# Patient Record
Sex: MALE | Race: BLACK OR AFRICAN AMERICAN | NOT HISPANIC OR LATINO | Employment: FULL TIME | URBAN - METROPOLITAN AREA
[De-identification: names, ages, dates, MRNs, and addresses within clinical notes are randomized per-mention and may not be internally consistent; named-entity substitution may affect disease eponyms.]

---

## 2023-12-18 ENCOUNTER — HOSPITAL ENCOUNTER (EMERGENCY)
Facility: CLINIC | Age: 38
Discharge: HOME | End: 2023-12-18
Attending: EMERGENCY MEDICINE | Admitting: EMERGENCY MEDICINE
Payer: OTHER MISCELLANEOUS

## 2023-12-18 ENCOUNTER — APPOINTMENT (OUTPATIENT)
Dept: GENERAL RADIOLOGY | Facility: CLINIC | Age: 38
End: 2023-12-18
Attending: EMERGENCY MEDICINE
Payer: OTHER MISCELLANEOUS

## 2023-12-18 VITALS
SYSTOLIC BLOOD PRESSURE: 141 MMHG | OXYGEN SATURATION: 100 % | RESPIRATION RATE: 16 BRPM | TEMPERATURE: 98.1 F | HEART RATE: 74 BPM | DIASTOLIC BLOOD PRESSURE: 85 MMHG

## 2023-12-18 DIAGNOSIS — M54.50 ACUTE LEFT-SIDED LOW BACK PAIN WITHOUT SCIATICA: ICD-10-CM

## 2023-12-18 PROCEDURE — 99283 EMERGENCY DEPT VISIT LOW MDM: CPT

## 2023-12-18 PROCEDURE — 250N000013 HC RX MED GY IP 250 OP 250 PS 637: Performed by: EMERGENCY MEDICINE

## 2023-12-18 PROCEDURE — 72100 X-RAY EXAM L-S SPINE 2/3 VWS: CPT

## 2023-12-18 RX ORDER — CYCLOBENZAPRINE HCL 10 MG
10 TABLET ORAL 3 TIMES DAILY PRN
Qty: 10 TABLET | Refills: 0 | Status: SHIPPED | OUTPATIENT
Start: 2023-12-18

## 2023-12-18 RX ORDER — IBUPROFEN 600 MG/1
600 TABLET, FILM COATED ORAL ONCE
Status: COMPLETED | OUTPATIENT
Start: 2023-12-18 | End: 2023-12-18

## 2023-12-18 RX ORDER — CYCLOBENZAPRINE HCL 10 MG
10 TABLET ORAL ONCE
Status: COMPLETED | OUTPATIENT
Start: 2023-12-18 | End: 2023-12-18

## 2023-12-18 RX ADMIN — CYCLOBENZAPRINE 10 MG: 10 TABLET, FILM COATED ORAL at 07:44

## 2023-12-18 RX ADMIN — IBUPROFEN 600 MG: 600 TABLET, FILM COATED ORAL at 07:44

## 2023-12-18 NOTE — ED TRIAGE NOTES
Pt reports while lifting something above his head at work this morning he developed lower back pain. Pt adds he is on abx for dental infection.      Triage Assessment (Adult)       Row Name 12/18/23 0453          Triage Assessment    Airway WDL WDL        Respiratory WDL    Respiratory WDL WDL        Skin Circulation/Temperature WDL    Skin Circulation/Temperature WDL WDL        Cardiac WDL    Cardiac WDL WDL        Peripheral/Neurovascular WDL    Peripheral Neurovascular WDL WDL        Cognitive/Neuro/Behavioral WDL    Cognitive/Neuro/Behavioral WDL WDL

## 2023-12-18 NOTE — LETTER
December 18, 2023      To Whom It May Concern:      Nydia Barnard was seen in our Emergency Department today, 12/18/23.  I expect his condition to improve over the next 2 days.  He may return to work/school when improved.    Sincerely,        Gely DUNN

## 2023-12-18 NOTE — ED PROVIDER NOTES
History     Chief Complaint:  Back Pain     HPI   Nydia Barnard is a 38 year old male who presents to the ED with low back pain. Patient states that he was lifting a small object above his head at work this morning when he developed sudden pain across his entire low back that was so severe he had to grab onto something to stabilize himself. Since stabilizing himself he has had continued pain in his low back that is worsened on the left side. The pain does not radiate elsewhere. He is now having a hard time walking due to the pain. Denies numbness or tingling in his legs and urinary or bowel incontinence. No prior back injuries. No known medication allergies. Patient adds that he is currently on a course of antibiotics for a dental infection.     Independent Historian:   None - Patient Only    Review of External Notes:   Reviewed 9/8/2023 office visit    Medications:    The patient is not currently taking any prescribed medications.    Past Medical History:    The patient denies any significant past medical history.    Physical Exam   Patient Vitals for the past 24 hrs:   BP Temp Temp src Pulse Resp SpO2   12/18/23 0451 (!) 141/85 98.1  F (36.7  C) Oral 74 16 100 %        Physical Exam  Constitutional: Alert, attentive  HENT:    Nose: Nose normal.    Mouth/Throat: Oropharynx is clear, mucous membranes are moist  Eyes: EOM are normal.    CV: brisk capillary refill to the distal extremities, 2+ DP pulses bilaterally.  Chest: Effort normal and breath sounds normal.   GI: No tenderness or distention  MSK: Normal range of motion. No midline T/L/S spine tenderness but indicates low midline lumbar spine pain  Neurological: Alert, attentive.  GCS 15; 5/5 strength throughout the bilateral lower extremities (hip flexion/extension, knee flexion/extension, DF/PF, EHL/FHL); sensation intact to light touch throughout L2-S1 distributions to the lower extremities; 2+ DTRs to the bilateral lower extremities (patellar,  achilles); normal gait  Skin: Skin is warm and dry.       Emergency Department Course     Imaging:  Lumbar spine XR, 2-3 views   Final Result   IMPRESSION:      1.  Suggestion of transitional lumbosacral anatomy with partially   lumbarized S1 segment and rudimentary disc at S1-S2.   2.  No radiographic evidence of acute fracture. Vertebral body heights   are maintained.   3.  Slight straightening of the lumbar lordosis. No substantial   listhesis.   4.  Multilevel degenerative disc disease throughout the lumbar spine   with disc height loss greatest and moderate at L5-S1.  Mild ventral   and dorsal marginal osteophytic ridging is noted at L5-S1. Generally   mild facet arthropathy, greatest in the lower lumbar spine.      ISAÍAS SILVERMAN MD            SYSTEM ID:  [id number]             Emergency Department Course & Assessments:       Interventions:  Medications   ibuprofen (ADVIL/MOTRIN) tablet 600 mg (600 mg Oral $Given 12/18/23 0744)   cyclobenzaprine (FLEXERIL) tablet 10 mg (10 mg Oral $Given 12/18/23 0744)        Assessments:  0723 I obtained history and examined the patient as noted above.  0838 I rechecked the patient and explained findings. Patient feels improved after the above interventions and is comfortable with discharge.     Independent Interpretation (X-rays, CTs, rhythm strip):  No fracture on lumbar spine x-ray      Disposition:  The patient was discharged to home.     Impression & Plan    CMS Diagnoses: None    Medical Decision Making:  This is a very pleasant 38 year old male who presented with back pain.  This is in the context of frequent lifting at work then raising his arms above his head.  Overall it appears most consistent with musculoskeletal strain.  However, based on midline pain pain, x-rays were performed.  These are fortunately negative.  Pain has improved with interventions in the emergency department.  There is no clinical evidence of cauda equina syndrome, discitis, spinal/epidural  space hematoma or epidural abscess. The neurological exam is normal and the patient's symptoms are consistent with a musculoskeletal pain.  The patient will be discharged with plan for ibuprofen and Flexeril. Ice or heat to the back and stretching exercises.  No heavy lifting, bending or twisting. Return if increasing pain, numbness, weakness, or bowel or bladder dysfunction.  The patient was advised to schedule primary care follow-up within 3 days to re-assess symptoms.        Diagnosis:    ICD-10-CM    1. Acute left-sided low back pain without sciatica  M54.50            Discharge Medications:  New Prescriptions    CYCLOBENZAPRINE (FLEXERIL) 10 MG TABLET    Take 1 tablet (10 mg) by mouth 3 times daily as needed for muscle spasms        Scribe Disclosure:  Clara MANSFIELD, am serving as a scribe at 7:23 AM on 12/18/2023 to document services personally performed by Darius Cho MD based on my observations and the provider's statements to me.   12/18/2023   Darius Cho MD Houghland, John Eric, MD  12/18/23 1600